# Patient Record
Sex: MALE | Race: ASIAN | NOT HISPANIC OR LATINO | Employment: STUDENT | URBAN - METROPOLITAN AREA
[De-identification: names, ages, dates, MRNs, and addresses within clinical notes are randomized per-mention and may not be internally consistent; named-entity substitution may affect disease eponyms.]

---

## 2022-01-05 ENCOUNTER — IMMUNIZATION (OUTPATIENT)
Dept: PRIMARY CARE CLINIC | Facility: CLINIC | Age: 27
End: 2022-01-05
Payer: MEDICAID

## 2022-01-05 DIAGNOSIS — Z23 NEED FOR VACCINATION: Primary | ICD-10-CM

## 2022-01-05 PROCEDURE — 0012A COVID-19, MRNA, LNP-S, PF, 100 MCG/0.5 ML DOSE VACCINE: CPT | Mod: PBBFAC,CV19 | Performed by: INTERNAL MEDICINE

## 2022-01-31 ENCOUNTER — LAB VISIT (OUTPATIENT)
Dept: LAB | Facility: HOSPITAL | Age: 27
End: 2022-01-31
Payer: MEDICAID

## 2022-01-31 ENCOUNTER — OFFICE VISIT (OUTPATIENT)
Dept: INTERNAL MEDICINE | Facility: CLINIC | Age: 27
End: 2022-01-31
Payer: MEDICAID

## 2022-01-31 VITALS
WEIGHT: 165.56 LBS | SYSTOLIC BLOOD PRESSURE: 124 MMHG | BODY MASS INDEX: 23.7 KG/M2 | HEART RATE: 55 BPM | DIASTOLIC BLOOD PRESSURE: 80 MMHG | HEIGHT: 70 IN

## 2022-01-31 DIAGNOSIS — M25.572 CHRONIC PAIN OF LEFT ANKLE: ICD-10-CM

## 2022-01-31 DIAGNOSIS — K62.5 BRBPR (BRIGHT RED BLOOD PER RECTUM): ICD-10-CM

## 2022-01-31 DIAGNOSIS — R21 RASH IN ADULT: ICD-10-CM

## 2022-01-31 DIAGNOSIS — S93.402A SPRAIN OF LEFT ANKLE, UNSPECIFIED LIGAMENT, INITIAL ENCOUNTER: ICD-10-CM

## 2022-01-31 DIAGNOSIS — G89.29 CHRONIC PAIN OF LEFT ANKLE: ICD-10-CM

## 2022-01-31 DIAGNOSIS — R21 GENERALIZED MACULOPAPULAR RASH: Primary | ICD-10-CM

## 2022-01-31 LAB
BASOPHILS # BLD AUTO: 0.05 K/UL (ref 0–0.2)
BASOPHILS NFR BLD: 0.9 % (ref 0–1.9)
DIFFERENTIAL METHOD: NORMAL
EOSINOPHIL # BLD AUTO: 0.4 K/UL (ref 0–0.5)
EOSINOPHIL NFR BLD: 6.2 % (ref 0–8)
ERYTHROCYTE [DISTWIDTH] IN BLOOD BY AUTOMATED COUNT: 12.3 % (ref 11.5–14.5)
HCT VFR BLD AUTO: 47.3 % (ref 40–54)
HGB BLD-MCNC: 15.4 G/DL (ref 14–18)
IMM GRANULOCYTES # BLD AUTO: 0.02 K/UL (ref 0–0.04)
IMM GRANULOCYTES NFR BLD AUTO: 0.3 % (ref 0–0.5)
LYMPHOCYTES # BLD AUTO: 2.6 K/UL (ref 1–4.8)
LYMPHOCYTES NFR BLD: 43.8 % (ref 18–48)
MCH RBC QN AUTO: 29.1 PG (ref 27–31)
MCHC RBC AUTO-ENTMCNC: 32.6 G/DL (ref 32–36)
MCV RBC AUTO: 89 FL (ref 82–98)
MONOCYTES # BLD AUTO: 0.5 K/UL (ref 0.3–1)
MONOCYTES NFR BLD: 8.6 % (ref 4–15)
NEUTROPHILS # BLD AUTO: 2.3 K/UL (ref 1.8–7.7)
NEUTROPHILS NFR BLD: 40.2 % (ref 38–73)
NRBC BLD-RTO: 0 /100 WBC
PLATELET # BLD AUTO: 242 K/UL (ref 150–450)
PMV BLD AUTO: 9.8 FL (ref 9.2–12.9)
RBC # BLD AUTO: 5.3 M/UL (ref 4.6–6.2)
WBC # BLD AUTO: 5.82 K/UL (ref 3.9–12.7)

## 2022-01-31 PROCEDURE — 99204 OFFICE O/P NEW MOD 45 MIN: CPT | Mod: S$PBB,,,

## 2022-01-31 PROCEDURE — 99204 PR OFFICE/OUTPT VISIT, NEW, LEVL IV, 45-59 MIN: ICD-10-PCS | Mod: S$PBB,,,

## 2022-01-31 PROCEDURE — 36415 COLL VENOUS BLD VENIPUNCTURE: CPT

## 2022-01-31 PROCEDURE — 99999 PR PBB SHADOW E&M-NEW PATIENT-LVL IV: CPT | Mod: PBBFAC,,,

## 2022-01-31 PROCEDURE — 85025 COMPLETE CBC W/AUTO DIFF WBC: CPT

## 2022-01-31 PROCEDURE — 99999 PR PBB SHADOW E&M-NEW PATIENT-LVL IV: ICD-10-PCS | Mod: PBBFAC,,,

## 2022-01-31 PROCEDURE — 99204 OFFICE O/P NEW MOD 45 MIN: CPT | Mod: PBBFAC

## 2022-01-31 NOTE — PROGRESS NOTES
INTERNAL MEDICINE CLINIC VISIT    Subjective     Chief Complaint:   Chief Complaint   Patient presents with    Rash       History of Present Illness:  Mr. Soto Callahan is a 26 y.o. male presenting with pruritic, maculopapular rash that has been present since early January. Recently moved to [place] and has new detergent, soaps, etc., but believes the trigger may be an undershirt he started to wear. Stopped wearing shirt and has had some resolution of papules, but with continued pruritis. He has a history of atopic dermatitis, but has had no other rash flares elsewhere. No fevers, chills, HA, N/V/D, joint pain. Reports sexual activity with one female partner, no hx of STIs, was last tested last year. No recent hiking activities nor travel to [place]-affected areas. Not on any immunosuppressive regimens.    Pt also reports BRBPR 2-3 times per week, intermittent, noticed with wiping and sometimes with blood in toilet bowel with enough volume that it changes toilet water color. No bowel changes, no abdominal pain/cramping, no hx of hemorrhoids. No constitutional symptoms and no family hx of colon CA. Father with hx of Crohn's disease.     Pt also has a chronic L ankle injury from years ago when he fell and inverted his L foot while playing basketball. Has been progressively worsening to the point that it causes him L knee discomfort, difficulty squatting, and worsening pain after ambulating all day. He has tried doing home PT exercises, but asks about the possibility of working with PT more intensively to help him improve functionalit and decrease pain.       Review of Systems   Constitutional: Negative for chills, fever, malaise/fatigue and weight loss.   HENT: Negative.  Negative for congestion, hearing loss and sinus pain.    Eyes: Negative for blurred vision.   Respiratory: Negative for cough, shortness of breath and wheezing.    Cardiovascular: Negative for chest pain, palpitations, orthopnea and leg swelling.  "  Gastrointestinal: Positive for blood in stool. Negative for abdominal pain, constipation, diarrhea, heartburn, nausea and vomiting.   Genitourinary: Negative for dysuria.   Musculoskeletal: Positive for joint pain (L ankle). Negative for myalgias.   Skin: Positive for itching and rash (chest and BL lateral abdomen).   Neurological: Negative for dizziness, weakness and headaches.   Psychiatric/Behavioral: Negative for depression. The patient is not nervous/anxious.        PAST HISTORY:     No past medical history on file.    No past surgical history on file.    No family history on file.    Social History     Socioeconomic History    Marital status: Single       MEDICATIONS & ALLERGIES:     No current outpatient medications on file prior to visit.     No current facility-administered medications on file prior to visit.       Review of patient's allergies indicates:  No Known Allergies    OBJECTIVE:     Vital Signs:  Vitals:    01/31/22 1037   BP: 124/80   Pulse: (!) 55   Weight: 75.1 kg (165 lb 9.1 oz)   Height: 5' 10" (1.778 m)       Body mass index is 23.76 kg/m².     Physical Exam  Vitals and nursing note reviewed.   Constitutional:       Appearance: Normal appearance.   HENT:      Head: Normocephalic and atraumatic.      Mouth/Throat:      Mouth: Mucous membranes are moist.      Pharynx: Oropharynx is clear.   Eyes:      Extraocular Movements: Extraocular movements intact.      Pupils: Pupils are equal, round, and reactive to light.   Cardiovascular:      Rate and Rhythm: Normal rate and regular rhythm.      Pulses: Normal pulses.      Heart sounds: Normal heart sounds.   Pulmonary:      Effort: Pulmonary effort is normal.      Breath sounds: Normal breath sounds.   Abdominal:      General: Bowel sounds are normal.      Palpations: Abdomen is soft.      Tenderness: There is no abdominal tenderness.      Comments: CHRIST negative for BRBPR, no masses, internal nor external hemorrhoids, no anal fissures "   Musculoskeletal:         General: Normal range of motion.      Cervical back: Normal range of motion and neck supple.   Skin:     General: Skin is warm and dry.      Findings: Rash (Macular, some erythema, BL subclavicular and lateral abdomen) present.   Neurological:      General: No focal deficit present.      Mental Status: He is alert and oriented to person, place, and time.   Psychiatric:         Mood and Affect: Mood normal.         Behavior: Behavior normal.         Laboratory  No results found for: WBC, HGB, HCT, MCV, PLT  BMP  No results found for: NA, K, CL, CO2, BUN, CREATININE, CALCIUM, ANIONGAP, ESTGFRAFRICA, EGFRNONAA  No results found for: INR, PROTIME  No results found for: HGBA1C    Diagnostic Results:    Health Maintenance Due   Topic Date Due    Hepatitis C Screening  Never done    Lipid Panel  Never done    COVID-19 Vaccine (1) Never done    HIV Screening  Never done    TETANUS VACCINE  Never done         ASSESSMENT & PLAN:   Mr. Soto Callahan is a 26 y.o. male presenting with        Generalized maculopapular rash    BRBPR (bright red blood per rectum)  -     Ambulatory referral/consult to Gastroenterology; Future; Expected date: 02/07/2022  -     Ambulatory referral/consult to Gastroenterology; Future; Expected date: 02/07/2022  -     CBC W/ AUTO DIFFERENTIAL; Future; Expected date: 01/31/2022    Rash in adult    Sprain of left ankle, unspecified ligament, initial encounter  -     Ambulatory referral/consult to Physical/Occupational Therapy; Future; Expected date: 02/07/2022    Chronic pain of left ankle       Problem List Items Addressed This Visit        Derm    Generalized maculopapular rash - Primary    Current Assessment & Plan     Discussed use of topical steroids, deferred at this time with pt preference, but he feels pruritis and rash are improving after avoiding undershirt.             GI    BRBPR (bright red blood per rectum)    Current Assessment & Plan     Pt with acute  BRBPR. Given hx of atopic dermatitis, some concern for atypical presentation of IBD. No constitutional symptoms concerning for malignancy. Physical exam negative for fissures, hemorrhoids.    - Referral to Gastroenterology           Relevant Orders    Ambulatory referral/consult to Gastroenterology    Ambulatory referral/consult to Gastroenterology    CBC W/ AUTO DIFFERENTIAL       Orthopedic    Chronic pain of left ankle    Overview     S/p traumatic injury, worsening pain with ambulation, squatting.    - Referral to PT/OT           Other Visit Diagnoses     Rash in adult        Sprain of left ankle, unspecified ligament, initial encounter        Relevant Orders    Ambulatory referral/consult to Physical/Occupational Therapy            Generalized maculopapular rash  Discussed use of topical steroids, deferred at this time with pt preference, but he feels pruritis and rash are improving after avoiding undershirt.     BRBPR (bright red blood per rectum)  Pt with acute BRBPR. Given hx of atopic dermatitis, some concern for atypical presentation of IBD. No constitutional symptoms concerning for malignancy. Physical exam negative for fissures, hemorrhoids.    - Referral to Gastroenterology        RTC if symptoms worsen    Discussed with Dr. Dobbins  - attestation to follow        Gregg Hoover MD  Internal Medicine, PGY-I  [facility]  [address], [place] [id number] [phone]

## 2022-01-31 NOTE — PATIENT INSTRUCTIONS
If unable to schedule with [facility], then you can call [facility]: [phone] to set up an appointment.

## 2022-01-31 NOTE — ASSESSMENT & PLAN NOTE
Discussed use of topical steroids, deferred at this time with pt preference, but he feels pruritis and rash are improving after avoiding undershirt.

## 2022-01-31 NOTE — ASSESSMENT & PLAN NOTE
Pt with acute BRBPR. Given hx of atopic dermatitis, some concern for atypical presentation of IBD. No constitutional symptoms concerning for malignancy. Physical exam negative for fissures, hemorrhoids.    - Referral to Gastroenterology

## 2022-02-07 NOTE — PROGRESS NOTES
"I have reviewed the notes, assessments, and/or procedures performed by Dr. Washburn. I concur with their documentation of Soto Callahan.     /80   Pulse (!) 55   Ht 5' 10" (1.778 m)   Wt 75.1 kg (165 lb 9.1 oz)   BMI 23.76 kg/m²      Mason Dobbins DO   "

## 2022-03-24 ENCOUNTER — PATIENT MESSAGE (OUTPATIENT)
Dept: INTERNAL MEDICINE | Facility: CLINIC | Age: 27
End: 2022-03-24
Payer: MEDICAID

## 2022-03-24 DIAGNOSIS — R21 GENERALIZED MACULOPAPULAR RASH: Primary | ICD-10-CM

## 2022-05-16 ENCOUNTER — TELEPHONE (OUTPATIENT)
Dept: ENDOSCOPY | Facility: HOSPITAL | Age: 27
End: 2022-05-16
Payer: MEDICAID

## 2022-05-16 NOTE — TELEPHONE ENCOUNTER
----- Message from Sandy Merritt MA sent at 5/15/2022  9:10 AM CDT -----  Regarding: RE: Gastro Referral to rule out IBD  Contact: pt  Were you able to contact the patient in regards to this? Due to our clinic not being able to treat the patient and your dept being able to, may you contact and see if July works for the patient please? Thanks.  ----- Message -----  From: Anne Yee MA  Sent: 5/13/2022   3:27 PM CDT  To: Sandy Merritt MA  Subject: RE: Gastro Referral to rule out IBD              We do not have any open  Medicaid slots until July  ----- Message -----  From: Sandy Merritt MA  Sent: 5/13/2022   3:06 PM CDT  To: , #  Subject: Gastro Referral to rule out IBD                  Good day may someone schedule this patient? Thank you!  ----- Message -----  From: Cindi Lucas NP  Sent: 5/13/2022  12:01 PM CDT  To: Sandy Merritt MA      ----- Message -----  From: Lizz Curtis  Sent: 5/13/2022  11:58 AM CDT  To: [facility] Staff    Pt requesting call back re: pt wants to r/s appt on 5-18 and would like to see an MD.    Confirmed contact below:  Contact Name:Soto Callahan  Phone Number: [phone]

## 2022-05-17 ENCOUNTER — TELEPHONE (OUTPATIENT)
Dept: SURGERY | Facility: CLINIC | Age: 27
End: 2022-05-17
Payer: MEDICAID

## 2022-06-23 ENCOUNTER — PATIENT MESSAGE (OUTPATIENT)
Dept: INTERNAL MEDICINE | Facility: CLINIC | Age: 27
End: 2022-06-23
Payer: MEDICAID

## 2022-06-23 ENCOUNTER — TELEPHONE (OUTPATIENT)
Dept: INTERNAL MEDICINE | Facility: CLINIC | Age: 27
End: 2022-06-23
Payer: MEDICAID

## 2022-06-23 DIAGNOSIS — K62.5 BRBPR (BRIGHT RED BLOOD PER RECTUM): Primary | ICD-10-CM

## 2022-06-23 NOTE — TELEPHONE ENCOUNTER
----- Message from Tex Huerta sent at 6/22/2022  4:48 PM CDT -----       Type: Patient Returning Call    Who Called: Pt  Who Left Message for Patient: NA  Does the patient know what this is regarding?: Referral to gastroenterology. No available appointment in locations that are close to the patient. Patient would like medical advice.   Would the patient rather a call back or a response via [facility]? Call  Best Call Back Number: [phone]  Additional Information: Please assist, thank you!

## 2022-06-23 NOTE — TELEPHONE ENCOUNTER
----- Message from Tex Huerta sent at 6/22/2022  4:48 PM CDT -----       Type: Patient Returning Call    Who Called: Pt  Who Left Message for Patient: NA  Does the patient know what this is regarding?: Referral to gastroenterology. No available appointment in locations that are close to the patient. Patient would like medical advice.   Would the patient rather a call back or a response via [facility]? Call  Best Call Back Number: [phone]  Additional Information: Please assist, thank you!

## 2022-06-28 ENCOUNTER — TELEPHONE (OUTPATIENT)
Dept: ENDOSCOPY | Facility: HOSPITAL | Age: 27
End: 2022-06-28
Payer: MEDICAID

## 2022-06-29 NOTE — TELEPHONE ENCOUNTER
----- Message from Yadi Macedo RN sent at 6/24/2022  3:01 PM CDT -----  I just spoke with this patient who is trying to set up a GI clinic visit.  Would you mind reaching out please?

## 2024-03-05 ENCOUNTER — LAB VISIT (OUTPATIENT)
Dept: LAB | Facility: HOSPITAL | Age: 29
End: 2024-03-05
Payer: COMMERCIAL

## 2024-03-05 ENCOUNTER — OFFICE VISIT (OUTPATIENT)
Dept: INTERNAL MEDICINE | Facility: CLINIC | Age: 29
End: 2024-03-05
Payer: COMMERCIAL

## 2024-03-05 VITALS — BODY MASS INDEX: 26.1 KG/M2 | WEIGHT: 181.88 LBS

## 2024-03-05 DIAGNOSIS — M25.521 BILATERAL ELBOW JOINT PAIN: ICD-10-CM

## 2024-03-05 DIAGNOSIS — Z00.00 ENCOUNTER FOR ANNUAL PHYSICAL EXAM: Primary | ICD-10-CM

## 2024-03-05 DIAGNOSIS — M25.522 BILATERAL ELBOW JOINT PAIN: ICD-10-CM

## 2024-03-05 DIAGNOSIS — M25.561 CHRONIC PAIN OF BOTH KNEES: ICD-10-CM

## 2024-03-05 DIAGNOSIS — Z23 ENCOUNTER FOR ADMINISTRATION OF VACCINE: ICD-10-CM

## 2024-03-05 DIAGNOSIS — Z00.00 ENCOUNTER FOR ANNUAL PHYSICAL EXAM: ICD-10-CM

## 2024-03-05 DIAGNOSIS — L70.9 ACNE, UNSPECIFIED ACNE TYPE: ICD-10-CM

## 2024-03-05 DIAGNOSIS — G89.29 CHRONIC PAIN OF BOTH KNEES: ICD-10-CM

## 2024-03-05 DIAGNOSIS — K60.2 ANAL FISSURE: ICD-10-CM

## 2024-03-05 DIAGNOSIS — M25.562 CHRONIC PAIN OF BOTH KNEES: ICD-10-CM

## 2024-03-05 LAB
CHOLEST SERPL-MCNC: 205 MG/DL (ref 120–199)
CHOLEST/HDLC SERPL: 4.4 {RATIO} (ref 2–5)
ESTIMATED AVG GLUCOSE: 103 MG/DL (ref 68–131)
HBA1C MFR BLD: 5.2 % (ref 4–5.6)
HCV AB SERPL QL IA: NORMAL
HDLC SERPL-MCNC: 47 MG/DL (ref 40–75)
HDLC SERPL: 22.9 % (ref 20–50)
HIV 1+2 AB+HIV1 P24 AG SERPL QL IA: NORMAL
LDLC SERPL CALC-MCNC: 135.4 MG/DL (ref 63–159)
NONHDLC SERPL-MCNC: 158 MG/DL
RHEUMATOID FACT SERPL-ACNC: <13 IU/ML (ref 0–15)
TRIGL SERPL-MCNC: 113 MG/DL (ref 30–150)

## 2024-03-05 PROCEDURE — 87389 HIV-1 AG W/HIV-1&-2 AB AG IA: CPT

## 2024-03-05 PROCEDURE — 86803 HEPATITIS C AB TEST: CPT

## 2024-03-05 PROCEDURE — 80061 LIPID PANEL: CPT

## 2024-03-05 PROCEDURE — 86038 ANTINUCLEAR ANTIBODIES: CPT

## 2024-03-05 PROCEDURE — 36415 COLL VENOUS BLD VENIPUNCTURE: CPT

## 2024-03-05 PROCEDURE — 3044F HG A1C LEVEL LT 7.0%: CPT | Mod: CPTII,S$GLB,,

## 2024-03-05 PROCEDURE — 3008F BODY MASS INDEX DOCD: CPT | Mod: CPTII,S$GLB,,

## 2024-03-05 PROCEDURE — 99395 PREV VISIT EST AGE 18-39: CPT | Mod: S$GLB,,,

## 2024-03-05 PROCEDURE — 99999 PR PBB SHADOW E&M-EST. PATIENT-LVL III: CPT | Mod: PBBFAC,,,

## 2024-03-05 PROCEDURE — 86431 RHEUMATOID FACTOR QUANT: CPT

## 2024-03-05 PROCEDURE — 83036 HEMOGLOBIN GLYCOSYLATED A1C: CPT

## 2024-03-05 RX ORDER — HYDROCORTISONE 25 MG/G
CREAM TOPICAL 2 TIMES DAILY
Qty: 20 G | Refills: 1 | Status: SHIPPED | OUTPATIENT
Start: 2024-03-05 | End: 2024-05-22

## 2024-03-05 RX ORDER — TRETINOIN 0.5 MG/G
CREAM TOPICAL NIGHTLY
Qty: 45 G | Refills: 3 | Status: SHIPPED | OUTPATIENT
Start: 2024-03-05

## 2024-03-05 NOTE — PROGRESS NOTES
Clinic Note  3/5/2024      Subjective:       Patient ID:  Soto is a 28 y.o. male being seen for an established visit.    Chief Complaint: Annual Exam    28-year-old male with medical history of acne and pes planus who presents to resident clinic for preventative visit. He recently graduated medical school. The following were discussed today:    Hepatitis-B vaccination: Per recent work document, his titers are possibly low. His employer has requested he obtain repeat HBV vaccination which has been ordered today.    Anal fissures: Previous history of bleeding per rectum, found to have anal fissures and prescribed rectal calcium-channel blocker that he was unable to obtain. He did not follow-up with Gastroenterology. CBC at that time was stable. Denies further bleeding per rectum but is interested in Anusol cream. He reports improvement in symptoms with increased fiber diet.    -- follow-up pending CBC  -- Anusol cream prescribed    Acne: Controlled with tretinoin cream. Refill sent.    Bilateral knee pain and pes planus: History of pes planus that he believes is causing bilateral knee pain. He is interested in physical therapy today. He is not interested in seeing Podiatry today. States that he wears generic insoles.    Bilateral elbow pain: Possibly ergonomic associated with recent studying. Denies new rashes. His father has a history of Crohn's. He does not have any autoimmune conditions.    -- EBONY and rheumatoid ordered  -- we discussed OTC Voltaren gel and NSAIDs as needed    Weight gain: We discussed exercise regimen and good diet    Upcoming travel: Plans to travel to [facility]. At this time, no [facility] guidance on malaria in the region. Not interested in travel Clinic referral. We discussed use of long sleeves and insect repellent.    Screening Lifestyle: Brief use of Lexapro for anxiety and depression which improved with CBT. Denies alcohol or tobacco use. Follow-up pending A1c, lipid panel, HCV and HIV. Given  "card for HBV and COVID vaccines.    Review of Systems   Constitutional:  Negative for fever.   HENT:  Negative for sore throat.    Eyes:  Negative for pain.   Respiratory:  Negative for shortness of breath.    Cardiovascular:  Negative for chest pain.   Gastrointestinal:  Negative for abdominal pain.   Genitourinary:  Negative for dysuria.   Musculoskeletal:  Negative for myalgias.   Skin:  Negative for rash.   Neurological:  Negative for headaches.   Psychiatric/Behavioral:  The patient is not nervous/anxious.        History reviewed. No pertinent past medical history.    Family History   Problem Relation Age of Onset    Crohn's disease Father             Review of patient's allergies indicates:  No Known Allergies    Patient Active Problem List   Diagnosis    Generalized maculopapular rash    BRBPR (bright red blood per rectum)    Chronic pain of left ankle         Objective:      There were no vitals taken for this visit.  Estimated body mass index is 23.76 kg/m² as calculated from the following:    Height as of 1/31/22: 5' 10" (1.778 m).    Weight as of 1/31/22: 75.1 kg (165 lb 9.1 oz).    Physical Exam  Constitutional:       Appearance: Normal appearance. He is not ill-appearing.   HENT:      Head: Normocephalic and atraumatic.      Nose: No rhinorrhea.      Mouth/Throat:      Mouth: Mucous membranes are moist.   Eyes:      Pupils: Pupils are equal, round, and reactive to light.   Cardiovascular:      Rate and Rhythm: Normal rate.      Heart sounds: No murmur heard.  Pulmonary:      Effort: Pulmonary effort is normal.      Breath sounds: Normal breath sounds.   Abdominal:      General: Abdomen is flat. There is no distension.      Palpations: Abdomen is soft.      Tenderness: There is no abdominal tenderness.   Musculoskeletal:         General: No swelling. Normal range of motion.      Cervical back: Normal range of motion.   Skin:     General: Skin is warm and dry.   Neurological:      General: No focal " deficit present.      Mental Status: He is alert.   Psychiatric:         Mood and Affect: Mood normal.           Assessment and Plan:         Soto was seen today for annual exam.    Diagnoses and all orders for this visit:    Encounter for annual physical exam  -     Ambulatory referral/consult to Optometry; Future  -     HEPATITIS C ANTIBODY; Future  -     HIV 1/2 Ag/Ab (4th Gen); Future  -     LIPID PANEL; Future  -     HEMOGLOBIN A1C; Future    Acne, unspecified acne type  -     tretinoin (RETIN-A) 0.05 % cream; Apply topically every evening.    Chronic pain of both knees  -     Ambulatory referral/consult to Physical/Occupational Therapy; Future    Bilateral elbow joint pain  -     EBONY; Future  -     RHEUMATOID FACTOR; Future    Encounter for administration of vaccine  -     Hepatitis B (Recombinant) Adjuvanted, 2 dose  -     Hepatitis B (Recombinant) Adjuvanted, 2 dose; Future    Anal fissure  -     hydrocortisone 2.5 % cream; Apply topically 2 (two) times daily.        Follow up in about 1 year (around 3/5/2025).        Andry Cleary MD  Internal Medicine, PGY-2  [facility]    Discussed with Dr. Calvert      I have discussed A/P with Dr Cleary and agree with plan of action.  Bienvenido Martin.

## 2024-03-06 LAB — ANA SER QL IF: NORMAL

## 2024-03-14 ENCOUNTER — PATIENT MESSAGE (OUTPATIENT)
Dept: INTERNAL MEDICINE | Facility: CLINIC | Age: 29
End: 2024-03-14
Payer: COMMERCIAL

## 2024-04-15 ENCOUNTER — CLINICAL SUPPORT (OUTPATIENT)
Dept: REHABILITATION | Facility: HOSPITAL | Age: 29
End: 2024-04-15
Payer: COMMERCIAL

## 2024-04-15 DIAGNOSIS — R29.898 WEAKNESS OF BOTH HIPS: ICD-10-CM

## 2024-04-15 DIAGNOSIS — M25.671 DECREASED RANGE OF MOTION OF BOTH ANKLES: Primary | ICD-10-CM

## 2024-04-15 DIAGNOSIS — M25.561 CHRONIC PAIN OF BOTH KNEES: ICD-10-CM

## 2024-04-15 DIAGNOSIS — M25.562 CHRONIC PAIN OF BOTH KNEES: ICD-10-CM

## 2024-04-15 DIAGNOSIS — G89.29 CHRONIC PAIN OF BOTH KNEES: ICD-10-CM

## 2024-04-15 DIAGNOSIS — M25.672 DECREASED RANGE OF MOTION OF BOTH ANKLES: Primary | ICD-10-CM

## 2024-04-15 PROCEDURE — 97161 PT EVAL LOW COMPLEX 20 MIN: CPT

## 2024-04-15 PROCEDURE — 97110 THERAPEUTIC EXERCISES: CPT

## 2024-04-16 PROBLEM — M25.672 DECREASED RANGE OF MOTION OF BOTH ANKLES: Status: ACTIVE | Noted: 2024-04-16

## 2024-04-16 PROBLEM — M25.671 DECREASED RANGE OF MOTION OF BOTH ANKLES: Status: ACTIVE | Noted: 2024-04-16

## 2024-04-16 PROBLEM — R29.898 WEAKNESS OF BOTH HIPS: Status: ACTIVE | Noted: 2024-04-16

## 2024-04-16 NOTE — PLAN OF CARE
[facility]   Physical Therapy Initial Evaluation      Name: Soto Callahan  [facility] Number: [id number]    Therapy Diagnosis:   Encounter Diagnoses   Name Primary?    Chronic pain of both knees     Decreased range of motion of both ankles Yes    Weakness of both hips         Physician: Andry Cleary MD    Physician Orders: PT Eval and Treat   Medical Diagnosis from Referral: M25.561,M25.562,G89.29 (ICD-10-CM) - Chronic pain of both knees   Evaluation Date: 4/15/2024  Authorization Period Expiration: 3/5/2025  Plan of Care Expiration: 7/8/2024  Progress Note Due: 5/15/2024  Visit # / Visits authorized: 1/ 1   FOTO: 1/ 3    Precautions: Standard     Time In: 1101am  Time Out: 1200pm  Total Billable Time: 59 minutes    Subjective     Date of onset: 5+ years of knee pain, about 2 months of elbow pain    History of current condition - Soto reports: that he is very flat footed and has been for a while. Over the past few years he has started to develop medial knee pain bilaterally when standing walking for too long. He is currently using inserts that help to limited his ankle pronation which has helped the knee pain a bit, but the pain is still present. His pain tends to be worse with prolonged walking, and stair navigation. His pain improves with NSAIDs and rest. He states that he would like to do more in the gym and play basketball but his knee pain is really limiting his ability to do this. He does yoga which he feels has improved his mobility and strength so it has helped a bit. He has sprained his ankles multiple time but the L has been sprained more. He also reports bilateral posterior elbow pain that gets worse with prolonged weight bearing on his hands and with lifting  anything too heavy.     Falls: None    Imaging: none:     Prior Therapy: none  Social History: 1 story home, lives alone  Occupation: finished medical school, doing research   Prior Level of Function: no pain or  "difficulty with ADLs or exercise  Current Level of Function: moderate pain and difficulty with ADLs and exercise    Pain:  Current 0/10, worst 5/10, best 0/10   Location: bilateral medial knees, bilateral posterior elbows   Description: Aching and Dull  Aggravating Factors: prolonged walking, and stair navigation  Easing Factors: NSAIDs and rest     Patients goals: to get his pain under control and be able to be more active      Medical History:   No past medical history on file.    Surgical History:   Soto Callahan  has no past surgical history on file.    Medications:   Soto has a current medication list which includes the following prescription(s): hydrocortisone and tretinoin.    Allergies:   Review of patient's allergies indicates:  No Known Allergies     Objective      Observation: pt ambulates into the clinic today independently and in no acute distress at this time.     Foot posture: in non weight bearing his ankles rest in exessive PF and inversion with a normal longitudinal arch. In standing there is excessive pronation with flattening of his feel bilaterally.     Range of Motion:   Knee Left active Left Passive Right Active R passive   Flexion 130 135 (medial knee pain) 130 135   Extension 0 +5 0 +5     Ankle Range of Motion:   Ankle Right Left   Dorsiflexion 0 0   Plantarflexion 55 55   Inversion 30 30   Eversion 10 10     Lower Extremity Strength  Right LE  Left LE    Hip extension:  3+/5 Hip extension: 3+/5   Hip abduction: 3/5 Hip abduction: 3/5   Hip ER 3/5 Ankle dorsiflexion: 3/5     Special Tests:   Left Right   Valgus Stress Test - -   Varus Stress test - -   Lachman's test - -   Posterior Lachman - -   Eliu's Test - -     Special Tests:   Right Left   Posterior Drawer Test - -   Anterior Drawer Test + +   Talar tilt test + +     Function:    - SLS R: 20" minimal sway  - SLS L: LOB after 5"    - DL Squat: excessive anterior weight shift. Heel rise bilaterally due to poor DF ROM    - SL " "squat R: excessive hip knee valgus, good depth  - SL squat L: excessive knee valgus and hip drop, decreased depth, medial knee pain    Joint Mobility: normal patellar mobility bilaterally. There is a severe restriction in his talocrural mobility on the L side and moderate restriction on the R. Decreased subtalar medial gapping bilaterally.      Intake Outcome Measure for FOTO Knee Survey    Therapist reviewed FOTO scores for Soto Callahan on 4/15/2024.   FOTO report - see Media section or FOTO account episode details.    Intake Score: 64%         Treatment     Total Treatment time (time-based codes) separate from Evaluation: 15 minutes     Soto received the treatments listed below:      therapeutic exercises to develop strength, endurance, and ROM for 11 minutes including:    Standing lunges on step for DF, 10x each side  SL pretzels, 10x each side with 3" holds  Bridges, 15x  SL clamshells with BTB, 10x each side    manual therapy techniques: Joint mobilizations were applied to the: ankles and hips  for 4 minutes, including:    Bilateral posterior talocrural mobs, grade IV  Bilateral talocrural distraction, grade V    Patient Education and Home Exercises     Education provided:   - HEP  - Plan of care  - Importance of improving hip strength and ankle mobility to offload the knees    Written Home Exercises Provided: Patient instructed to cont prior HEP. Exercises were reviewed and Soto was able to demonstrate them prior to the end of the session.  Soto demonstrated good  understanding of the education provided. See EMR under Patient Instructions for exercises provided during therapy sessions.    Assessment     Soto is a 28 y.o. male referred to outpatient Physical Therapy with a medical diagnosis of Chronic pain of both knees. Patient presents with a long history of bilateral knee and elbow pain. He demonstrates bilateral talocrural stiffness, decreased ankle DF ROM, + special tests for ankle " ligamentous injuries, impaired SL balance, poor functional movement patterns, bilateral hip weakness, and impaired overall functional ability.     Patient prognosis is Excellent.   Patient will benefit from skilled outpatient Physical Therapy to address the deficits stated above and in the chart below, provide patient /family education, and to maximize patientt's level of independence.     Plan of care discussed with patient: Yes  Patient's spiritual, cultural and educational needs considered and patient is agreeable to the plan of care and goals as stated below:     Anticipated Barriers for therapy: none    Medical Necessity is demonstrated by the following  History  Co-morbidities and personal factors that may impact the plan of care [x] LOW: no personal factors / co-morbidities  [] MODERATE: 1-2 personal factors / co-morbidities  [] HIGH: 3+ personal factors / co-morbidities    Moderate / High Support Documentation:   Co-morbidities affecting plan of care: none    Personal Factors:   no deficits     Examination  Body Structures and Functions, activity limitations and participation restrictions that may impact the plan of care [] LOW: addressing 1-2 elements  [x] MODERATE: 3+ elements  [] HIGH: 4+ elements (please support below)    Moderate / High Support Documentation: ROM, strength, motor control      Clinical Presentation [x] LOW: stable  [] MODERATE: Evolving  [] HIGH: Unstable     Decision Making/ Complexity Score: low       Goals:  Short Term Goals (6 Weeks):   1. Pt will be compliant with HEP to supplement PT in restoring pain free function.  2. Pt will improve impaired LE strength by 1/2 MMT grade to improve strength for functional tasks  3. Pt improve impaired ankle DF ROM by 3 deg to improve mobility for normal movement patterns.   Long Term Goals (12 Weeks):  1. Pt will improve FOTO score to </= 81% to decrease perceived limitation with mobility  2. Pt will improve impaired LE strength by 1 MMT grade to  improve strength for functional tasks  3. Pt improve impaired ankle DF ROM by 6 deg to improve mobility for normal movement patterns.     Plan     Plan of care Certification: 4/15/2024 to 7/8/2024.    Outpatient Physical Therapy 1 time every other week for 12 weeks to include the following interventions: Gait Training, Manual Therapy, Moist Heat/ Ice, Neuromuscular Re-ed, Patient Education, Self Care, Therapeutic Activities, and Therapeutic Exercise.     WILLIS VEGA, COSME    Physician's Signature: _________________________________________ Date: ________________

## 2024-05-06 ENCOUNTER — CLINICAL SUPPORT (OUTPATIENT)
Dept: REHABILITATION | Facility: HOSPITAL | Age: 29
End: 2024-05-06
Payer: COMMERCIAL

## 2024-05-06 DIAGNOSIS — M25.671 DECREASED RANGE OF MOTION OF BOTH ANKLES: Primary | ICD-10-CM

## 2024-05-06 DIAGNOSIS — M25.672 DECREASED RANGE OF MOTION OF BOTH ANKLES: Primary | ICD-10-CM

## 2024-05-06 DIAGNOSIS — R29.898 WEAKNESS OF BOTH HIPS: ICD-10-CM

## 2024-05-06 PROCEDURE — 97140 MANUAL THERAPY 1/> REGIONS: CPT

## 2024-05-06 PROCEDURE — 97530 THERAPEUTIC ACTIVITIES: CPT

## 2024-05-06 PROCEDURE — 97110 THERAPEUTIC EXERCISES: CPT

## 2024-05-06 PROCEDURE — 97112 NEUROMUSCULAR REEDUCATION: CPT

## 2024-05-06 NOTE — PROGRESS NOTES
"  Physical Therapy Daily Treatment Note     Name: Soto Callahan  Clinic Number: [id number]    Therapy Diagnosis:   Encounter Diagnoses   Name Primary?    Decreased range of motion of both ankles Yes    Weakness of both hips      Physician: Andry Cleary MD    Visit Date: 5/6/2024    Physician Orders: PT Eval and Treat   Medical Diagnosis from Referral: M25.561,M25.562,G89.29 (ICD-10-CM) - Chronic pain of both knees   Evaluation Date: 4/15/2024  Authorization Period Expiration: 3/5/2025  Plan of Care Expiration: 7/8/2024  Progress Note Due: 5/15/2024  Visit # / Visits authorized: 1/ 1   FOTO: 1/ 3    1st FOTO Follow up:   2nd FOTO Follow up:     Time In: 1003am  Time Out: 1100am  Total Billable Time: 55 minutes    Precautions: Standard    Subjective     Pt reports: that overall he is noticing a good amount of improvement in his knee pain. He has been working very hard on his ankle mobility which at times he feels is a lot better but other times he feels like his ankle is still very stiff.    He was compliant with home exercise program.  Response to previous treatment: decreased pain  Functional change: ongoing    Pain: 2/10  Location: bilateral medial knees, bilateral posterior elbows      Objective     Soto received therapeutic exercises to develop strength, endurance, and ROM for 10 minutes including:    Standing lunges on step for DF, 3 x 8 each side  Gastroc stretch on the wall, 2 x 30" on each side  Soleus stretch on the wall, 2 x 30" on each side    Soto received the following manual therapy techniques: Joint mobilizations were applied to the: ankles, knees, and hips for 23 minutes, including:    Posterior talocrural mobs, grade IV (bilateral)  Talocrural distraction, grade V (bilateral)  Medial subtalar joint gapping (bilateral)  Gastroc FMP for DF, 2 rounds of 3 on each side  Soleus FMP for DF, 2 rounds of 3 on each side    Soto participated in neuromuscular re-education activities to improve: " "Balance, Coordination, Kinesthetic, Sense, and Proprioception for 12 minutes. The following activities were included:    SL pretzels with 2#, 2 x 10 each side with 3" holds  Bridges with BTB, 3 x 8  Side plank + clamshells with BTB, 2 x 10 each side  Hip hinges with dowel tabitha, 3 x 8    Soto participated in dynamic functional therapeutic activities to improve functional performance for 10  minutes, including:    Step downs on 6" step, 2 x 10 on each side  Lateral walking with GTB around shins, 10 yards x4    Home Exercises Provided and Patient Education Provided     Education provided:   - HEP  - Plan of care  - Importance of improving hip strength and ankle mobility to offload the knees    Written Home Exercises Provided: Patient instructed to cont prior HEP.  Exercises were reviewed and Soto was able to demonstrate them prior to the end of the session.  Soto demonstrated good  understanding of the education provided.     See EMR under Patient Instructions for exercises provided  4/15/2024 .    Assessment     "Talon" presents to PT today with improved reports of pain compared to his initial evaluation 3 weeks ago. His talocrural mobility in the posterior direction was much improved today but he does remain with DF ROM restrictions. There does seem to be a soft issue restriction at the calf that is limiting his ankle DF as well so this was addressed manually and with active stretching. He was further progressed with hip strengthening exercise today with good tolerance. He demonstrated good control with hip hinging today.     Soto Is progressing well towards his goals.   Pt prognosis is Excellent.     Pt will continue to benefit from skilled outpatient physical therapy to address the deficits listed in the problem list box on initial evaluation, provide pt/family education and to maximize pt's level of independence in the home and community environment.     Pt's spiritual, cultural and educational needs " considered and pt agreeable to plan of care and goals.     Anticipated barriers to physical therapy: none    Goals:   Short Term Goals (6 Weeks):   1. Pt will be compliant with HEP to supplement PT in restoring pain free function.  2. Pt will improve impaired LE strength by 1/2 MMT grade to improve strength for functional tasks  3. Pt improve impaired ankle DF ROM by 3 deg to improve mobility for normal movement patterns.   Long Term Goals (12 Weeks):  1. Pt will improve FOTO score to </= 81% to decrease perceived limitation with mobility  2. Pt will improve impaired LE strength by 1 MMT grade to improve strength for functional tasks  3. Pt improve impaired ankle DF ROM by 6 deg to improve mobility for normal movement patterns.     Plan     Continue to progress per VENUS VEGA, PT   Board Certified in Orthopedic Physical Therapy

## 2024-05-17 ENCOUNTER — CLINICAL SUPPORT (OUTPATIENT)
Dept: REHABILITATION | Facility: HOSPITAL | Age: 29
End: 2024-05-17
Payer: COMMERCIAL

## 2024-05-17 DIAGNOSIS — R29.898 WEAKNESS OF BOTH HIPS: ICD-10-CM

## 2024-05-17 DIAGNOSIS — M25.671 DECREASED RANGE OF MOTION OF BOTH ANKLES: Primary | ICD-10-CM

## 2024-05-17 DIAGNOSIS — M25.672 DECREASED RANGE OF MOTION OF BOTH ANKLES: Primary | ICD-10-CM

## 2024-05-17 PROCEDURE — 97140 MANUAL THERAPY 1/> REGIONS: CPT

## 2024-05-17 PROCEDURE — 97110 THERAPEUTIC EXERCISES: CPT

## 2024-05-17 PROCEDURE — 97112 NEUROMUSCULAR REEDUCATION: CPT

## 2024-05-17 PROCEDURE — 97530 THERAPEUTIC ACTIVITIES: CPT

## 2024-05-17 NOTE — PROGRESS NOTES
"  Physical Therapy Daily Treatment Note     Name: Soto Callahan  Clinic Number: [id number]    Therapy Diagnosis:   Encounter Diagnoses   Name Primary?    Decreased range of motion of both ankles Yes    Weakness of both hips        Physician: Andry Cleary MD    Visit Date: 5/17/2024    Physician Orders: PT Eval and Treat   Medical Diagnosis from Referral: M25.561,M25.562,G89.29 (ICD-10-CM) - Chronic pain of both knees   Evaluation Date: 4/15/2024  Authorization Period Expiration: 12/31/2024  Plan of Care Expiration: 7/8/2024  Progress Note Due: 5/15/2024  Visit # / Visits authorized: 2/20  FOTO: 1/ 3    1st FOTO Follow up:   2nd FOTO Follow up:     Time In: 1002am  Time Out: 1055am  Total Billable Time: 50 minutes    Precautions: Standard    Subjective     Pt reports: that he continues to notice improvement. He still gets some L knee pain with prolonged standing and walking.   He was compliant with home exercise program.  Response to previous treatment: decreased pain  Functional change: ongoing    Pain: 2/10  Location: bilateral medial knees, bilateral posterior elbows      Objective     Soto received therapeutic exercises to develop strength, endurance, and ROM for 12 minutes including:    Standing lunges on step for DF, 3 x 8 each side  Gastroc stretch on the wall, 2 x 30" on each side  Soleus stretch on the wall, 2 x 30" on each side    Soto received the following manual therapy techniques: Joint mobilizations were applied to the: ankles, knees, and hips for 10 minutes, including:    Posterior talocrural mobs, grade IV (bilateral)  Talocrural distraction, grade V (bilateral)  Medial subtalar joint gapping (bilateral)    Soto participated in neuromuscular re-education activities to improve: Balance, Coordination, Kinesthetic, Sense, and Proprioception for 15 minutes. The following activities were included:    SL pretzels with 3#, 2 x 10 each side with 3" holds  Bridges with BTB + march, 3 x 8  Side " "plank + clamshells with BTB, 2 x 10 each side  Hip hinges with dowel tabitha, 3 x 8    Soto participated in dynamic functional therapeutic activities to improve functional performance for 13  minutes, including:    Step downs on 6" step, 2 x 10 on each side  Lateral walking with GTB around shins, 10 yards x4  Squats with GTB around the knees, 3 x 8    Home Exercises Provided and Patient Education Provided     Education provided:   - HEP  - Plan of care  - Importance of improving hip strength and ankle mobility to offload the knees    Written Home Exercises Provided: Patient instructed to cont prior HEP.  Exercises were reviewed and Soto was able to demonstrate them prior to the end of the session.  Soto demonstrated good  understanding of the education provided.     See EMR under Patient Instructions for exercises provided  4/15/2024 .    Assessment     "Talon" displayed some improvement in his talocrural mobility today compared to his initial evaluation. He does still lack closed chain DF but both joint and soft tissue restrictions seem to be a factor. His hips strengthening and control exercises were progressed today and he was appropriately challenged with this.     Soto Is progressing well towards his goals.   Pt prognosis is Excellent.     Pt will continue to benefit from skilled outpatient physical therapy to address the deficits listed in the problem list box on initial evaluation, provide pt/family education and to maximize pt's level of independence in the home and community environment.     Pt's spiritual, cultural and educational needs considered and pt agreeable to plan of care and goals.     Anticipated barriers to physical therapy: none    Goals:   Short Term Goals (6 Weeks):   1. Pt will be compliant with HEP to supplement PT in restoring pain free function.  2. Pt will improve impaired LE strength by 1/2 MMT grade to improve strength for functional tasks  3. Pt improve impaired ankle DF ROM by " 3 deg to improve mobility for normal movement patterns.   Long Term Goals (12 Weeks):  1. Pt will improve FOTO score to </= 81% to decrease perceived limitation with mobility  2. Pt will improve impaired LE strength by 1 MMT grade to improve strength for functional tasks  3. Pt improve impaired ankle DF ROM by 6 deg to improve mobility for normal movement patterns.     Plan     Continue to progress per VENUS VEGA PT   Board Certified in Orthopedic Physical Therapy

## 2024-05-21 ENCOUNTER — OFFICE VISIT (OUTPATIENT)
Dept: INTERNAL MEDICINE | Facility: CLINIC | Age: 29
End: 2024-05-21
Payer: COMMERCIAL

## 2024-05-21 DIAGNOSIS — F41.1 GENERALIZED ANXIETY DISORDER: Primary | ICD-10-CM

## 2024-05-21 PROCEDURE — 99214 OFFICE O/P EST MOD 30 MIN: CPT | Mod: 95,,, | Performed by: INTERNAL MEDICINE

## 2024-05-21 PROCEDURE — 3044F HG A1C LEVEL LT 7.0%: CPT | Mod: CPTII,95,, | Performed by: INTERNAL MEDICINE

## 2024-05-21 RX ORDER — ESCITALOPRAM OXALATE 5 MG/1
5 TABLET ORAL DAILY
Qty: 90 TABLET | Refills: 3 | Status: SHIPPED | OUTPATIENT
Start: 2024-05-21 | End: 2025-05-21

## 2024-05-21 NOTE — PROGRESS NOTES
Subjective:       Patient ID: Soto Callahan is a 28 y.o. male.    Chief Complaint: No chief complaint on file.      The patient location is: [place]   The chief complaint leading to consultation is: depression     Visit type: audiovisual    Face to Face time with patient: 15 minutes  15 minutes of total time spent on the encounter, which includes face to face time and non-face to face time preparing to see the patient (eg, review of tests), Obtaining and/or reviewing separately obtained history, Documenting clinical information in the electronic or other health record, Independently interpreting results (not separately reported) and communicating results to the patient/family/caregiver, or Care coordination (not separately reported).         Each patient to whom he or she provides medical services by telemedicine is:  (1) informed of the relationship between the physician and patient and the respective role of any other health care provider with respect to management of the patient; and (2) notified that he or she may decline to receive medical services by telemedicine and may withdraw from such care at any time.    Notes:     HPI    Patient presents for evaluiation of generalized Anxiety disorder.    Was previously on Lexapro 5 mg daily last fall but stopped medication and the beginning of this year.  He states since stopping the medication he is having worsening of his anxiety with spiraling thoughts and feeling overwhelmed with certain life stressors.  He was also previously doing CBT and has plans to restart this.    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    He did tolerate Lexapro well and did not have any side effects with the medication.    Review of Systems   Constitutional:  Negative for activity change, chills, diaphoresis, fatigue, fever and unexpected weight change.   HENT:  Negative for hearing loss, rhinorrhea, sneezing, sore throat and  trouble swallowing.    Eyes:  Negative for discharge and visual disturbance.   Respiratory:  Negative for cough, chest tightness, shortness of breath and wheezing.    Cardiovascular:  Positive for palpitations. Negative for chest pain and leg swelling.   Gastrointestinal:  Negative for abdominal pain, blood in stool, constipation, diarrhea, nausea and vomiting.   Endocrine: Negative for polydipsia and polyuria.   Genitourinary:  Negative for difficulty urinating, hematuria and urgency.   Musculoskeletal:  Negative for arthralgias, back pain, joint swelling and neck pain.   Neurological:  Negative for dizziness, weakness, light-headedness and headaches.   Psychiatric/Behavioral:  Positive for dysphoric mood. Negative for agitation, behavioral problems and confusion.          Objective:      Physical Exam  Constitutional:       Appearance: Normal appearance.   Neurological:      Mental Status: He is alert and oriented to person, place, and time.   Psychiatric:         Mood and Affect: Mood normal.         Assessment:       Problem List Items Addressed This Visit    None  Visit Diagnoses       Generalized anxiety disorder    -  Primary            Plan:       Diagnoses and all orders for this visit:    Generalized anxiety disorder    Other orders  -     EScitalopram oxalate (LEXAPRO) 5 MG Tab; Take 1 tablet (5 mg total) by mouth once daily.             We will restart Lexapro 5 mg daily.  He will get set up with new PCP after he finishes moving.  He is also in the process of setting up his CBT.    Carley Guzman MD

## 2024-05-24 ENCOUNTER — CLINICAL SUPPORT (OUTPATIENT)
Dept: REHABILITATION | Facility: HOSPITAL | Age: 29
End: 2024-05-24
Payer: COMMERCIAL

## 2024-05-24 DIAGNOSIS — R29.898 WEAKNESS OF BOTH HIPS: ICD-10-CM

## 2024-05-24 DIAGNOSIS — M25.671 DECREASED RANGE OF MOTION OF BOTH ANKLES: Primary | ICD-10-CM

## 2024-05-24 DIAGNOSIS — M25.672 DECREASED RANGE OF MOTION OF BOTH ANKLES: Primary | ICD-10-CM

## 2024-05-24 PROCEDURE — 97530 THERAPEUTIC ACTIVITIES: CPT

## 2024-05-24 PROCEDURE — 97112 NEUROMUSCULAR REEDUCATION: CPT

## 2024-05-24 PROCEDURE — 97110 THERAPEUTIC EXERCISES: CPT

## 2024-05-24 PROCEDURE — 97140 MANUAL THERAPY 1/> REGIONS: CPT

## 2024-05-24 NOTE — PROGRESS NOTES
Physical Therapy Daily Treatment Note / Discharge Note     Name: Soto Callahan  Clinic Number: [id number]    Therapy Diagnosis:   Encounter Diagnoses   Name Primary?    Decreased range of motion of both ankles Yes    Weakness of both hips      Physician: Andry Cleary MD    Visit Date: 5/24/2024    Physician Orders: PT Eval and Treat   Medical Diagnosis from Referral: M25.561,M25.562,G89.29 (ICD-10-CM) - Chronic pain of both knees   Evaluation Date: 4/15/2024  Authorization Period Expiration: 12/31/2024  Plan of Care Expiration: 7/8/2024  Progress Note Due: 5/15/2024  Visit # / Visits authorized: 3/20  FOTO: 1/ 3    1st FOTO Follow up:   2nd FOTO Follow up:     Time In: 1000am  Time Out: 1055am  Total Billable Time: 53 minutes    Precautions: Standard    Subjective     Pt reports: that he continues to notice improvement. He still gets some L knee pain with prolonged standing and walking.   He was compliant with home exercise program.  Response to previous treatment: decreased pain  Functional change: ongoing    Pain: 2/10  Location: bilateral medial knees, bilateral posterior elbows      Objective     Range of Motion:   Knee Left active Left Passive Right Active R passive   Flexion 130 135  130 135   Extension 0 +5 0 +5      Ankle Range of Motion:   Ankle Right Left   Dorsiflexion 3 3   Plantarflexion 55 55   Inversion 30 30   Eversion 10 10      Lower Extremity Strength  Right LE   Left LE     Hip extension:  4/5 Hip extension: 4/5   Hip abduction: 3+/5 Hip abduction: 3+/5   Hip ER 3+/5 Ankle dorsiflexion: 3+/5      Special Tests:    Left Right   Valgus Stress Test - -   Varus Stress test - -   Lachman's test - -   Posterior Lachman - -   Eliu's Test - -      Special Tests:    Right Left   Posterior Drawer Test - -   Anterior Drawer Test + +   Talar tilt test + +        Soto received therapeutic exercises to develop strength, endurance, and ROM for 15 minutes including:    Assessment as above  Standing  "lunges on step for DF, 3 x 8 each side  Gastroc stretch on the wall, 2 x 30" on each side  Soleus stretch on the wall, 2 x 30" on each side    Soto received the following manual therapy techniques: Joint mobilizations were applied to the: ankles, knees, and hips for 10 minutes, including:    Posterior talocrural mobs, grade IV (bilateral)  Talocrural distraction, grade V (bilateral)  Medial subtalar joint gapping (bilateral)    Soto participated in neuromuscular re-education activities to improve: Balance, Coordination, Kinesthetic, Sense, and Proprioception for 15 minutes. The following activities were included:    SL pretzels with 3#, 2 x 10 each side with 3" holds  Bridges with BTB + march, 3 x 8  Side plank + clamshells with BTB, 2 x 10 each side  Hip hikes on the wall, 15x on each side with 5" holds    Soto participated in dynamic functional therapeutic activities to improve functional performance for 13  minutes, including:    Step downs on 6" step, 2 x 10 on each side  Lateral walking with BTB around shins + 10# KB press out, 10 yards x4  RDLs with 20#, 3 x 8  Squats with GTB around the knees, 3 x 8    Home Exercises Provided and Patient Education Provided     Education provided:   - HEP  - Plan of care  - Importance of improving hip strength and ankle mobility to offload the knees    Written Home Exercises Provided: Patient instructed to cont prior HEP.  Exercises were reviewed and Soto was able to demonstrate them prior to the end of the session.  Soto demonstrated good  understanding of the education provided.     See EMR under Patient Instructions for exercises provided  4/15/2024 .    Assessment     "Talon" has been treated for a total of 4 visits over the past 5 weeks to address his complains of bilateral medial knee pain. Since the onset of treatment he has demonstrated good improvement in his talocrural mobility and ankle DF ROM. He remains with hypermobility into inversion and + ankle " instability tests. His hip strength has also demonstrated improvement at this time. [place]on [place] so he will be discharged from skilled PT services at this time. His HEP was updated today.     Soto Is progressing well towards his goals.   Pt prognosis is Excellent.     Pt will continue to benefit from skilled outpatient physical therapy to address the deficits listed in the problem list box on initial evaluation, provide pt/family education and to maximize pt's level of independence in the home and community environment.     Pt's spiritual, cultural and educational needs considered and pt agreeable to plan of care and goals.     Anticipated barriers to physical therapy: none    Goals:   Short Term Goals (6 Weeks):   1. Pt will be compliant with HEP to supplement PT in restoring pain free function. (MET)  2. Pt will improve impaired LE strength by 1/2 MMT grade to improve strength for functional tasks. (MET)  3. Pt improve impaired ankle DF ROM by 3 deg to improve mobility for normal movement patterns. (MET)  Long Term Goals (12 Weeks):  1. Pt will improve FOTO score to </= 81% to decrease perceived limitation with mobility. (Not fully assessed)  2. Pt will improve impaired LE strength by 1 MMT grade to improve strength for functional tasks. (Not met)  3. Pt improve impaired ankle DF ROM by 6 deg to improve mobility for normal movement patterns. (Not met)    Plan     Discharge from skilled PT services.     WILLIS VEGA, PT   Board Certified in Orthopedic Physical Therapy